# Patient Record
Sex: MALE | Race: WHITE | NOT HISPANIC OR LATINO | Employment: FULL TIME | URBAN - METROPOLITAN AREA
[De-identification: names, ages, dates, MRNs, and addresses within clinical notes are randomized per-mention and may not be internally consistent; named-entity substitution may affect disease eponyms.]

---

## 2017-02-17 ENCOUNTER — APPOINTMENT (OUTPATIENT)
Dept: LAB | Facility: MEDICAL CENTER | Age: 32
End: 2017-02-17
Payer: COMMERCIAL

## 2017-02-17 DIAGNOSIS — Z11.59 ENCOUNTER FOR SCREENING FOR OTHER VIRAL DISEASES: ICD-10-CM

## 2017-02-17 PROCEDURE — 87522 HEPATITIS C REVRS TRNSCRPJ: CPT

## 2017-02-17 PROCEDURE — 86705 HEP B CORE ANTIBODY IGM: CPT

## 2017-02-17 PROCEDURE — 36415 COLL VENOUS BLD VENIPUNCTURE: CPT

## 2017-02-17 PROCEDURE — 86803 HEPATITIS C AB TEST: CPT

## 2017-02-17 PROCEDURE — 86704 HEP B CORE ANTIBODY TOTAL: CPT

## 2017-02-17 PROCEDURE — 87340 HEPATITIS B SURFACE AG IA: CPT

## 2017-02-18 LAB
HBV CORE AB SER QL: ABNORMAL
HBV CORE IGM SER QL: ABNORMAL
HBV SURFACE AG SER QL: ABNORMAL
HCV AB SER QL: ABNORMAL

## 2017-02-21 LAB
HCV RNA SERPL NAA+PROBE-ACNC: NORMAL IU/ML
TEST INFORMATION: NORMAL

## 2017-02-22 ENCOUNTER — GENERIC CONVERSION - ENCOUNTER (OUTPATIENT)
Dept: OTHER | Facility: OTHER | Age: 32
End: 2017-02-22

## 2018-01-11 NOTE — PROGRESS NOTES
Assessment    1  Encounter for preventive health examination (V70 0) (Z00 00)   2  Folliculitis (332 7) (K79 3)   3  Allergic rhinitis (477 9) (J30 9)   4  Screening, lipid (V77 91) (Z13 220)   5  DM (diabetes mellitus screen) (V77 1) (Z13 1)    Plan  Allergic rhinitis    · Fluticasone Propionate 50 MCG/ACT Nasal Suspension; USE 1 SPRAY IN EACH  NOSTRIL TWICE DAILY PRN  DM (diabetes mellitus screen), Screening, lipid    · (1) HEMOGLOBIN A1C; Status:Active; Requested [id number];    · (1) LIPID PANEL, FASTING; Status:Active; Requested [facility]:[id number];   Fatty liver    · Clindamycin Phosphate 1 % External Solution; APPLY SPARINGLY TO AFFECTED  AREA(S) ONCE DAILY at night    Discussion/Summary  Impression: health maintenance visit  Currently, he eats a poor diet and has an inadequate exercise regimen  Prostate cancer screening: PSA is not indicated  Colorectal cancer screening: colorectal cancer screening is not indicated  Screening lab work includes lipid profile  The immunizations are up to date  Advice and education were given regarding nutrition, aerobic exercise and weight loss  Chief Complaint  Wellness      History of Present Illness  HM, Adult Male: The patient is being seen for a health maintenance evaluation  The last health maintenance visit was 1 year(s) ago  Social History: Household members include spouse, 2 daughter(s) and wife 21 weeks pregnant  He is   Work status: working full time and occupation: [employer]  General Health: The patient's health since the last visit is described as good  He has regular dental visits  He complains of vision problems  Vision care includes an eye examination within the last year  He denies hearing loss  Immunizations status: up to date  Lifestyle:  He consumes a diverse and healthy diet  He has weight concerns  He does not exercise regularly  He uses tobacco  He consumes alcohol  He reports occasional alcohol use     Reproductive health:  the patient is sexually active  Screening: Prostate cancer screening includes no previous evaluation  Colorectal cancer screening includes no previous screening  Metabolic screening includes lipid profile performed within the past five years and glucose screening performed last year  Cardiovascular risk factors: no hypertension and no diabetes  Review of Systems    Constitutional: recent weight gain, but no fever and no chills  ENT: seasonal allegies, but no sore throat and no nasal discharge  Cardiovascular: no chest pain and no palpitations  Respiratory: no shortness of breath and no cough  Gastrointestinal: no abdominal pain, no constipation and no diarrhea  Genitourinary: no dysuria  Musculoskeletal: no arthralgias and no myalgias  Integumentary: folliculitis on scalp, needs refill of Clindamycin solution he used to get from derm  Active Problems    1  Allergic rhinitis (477 9) (J30 9)   2  DM (diabetes mellitus screen) (V77 1) (Z13 1)   3  Fatty liver (571 8) (K76 0)   4   Screening, lipid (V77 91) (Z13 220)    Past Medical History    · History of Anxiety (300 00) (F41 9)   · History of Anxiety disorder (300 00) (F41 9)   · History of Blood chemistry abnormality (790 6) (R79 9)   · History of Blood pressure elevated (401 9) (I10)   · History of Chronic pain syndrome (338 4) (G89 4)   · History of Exposure to STD (V01 6) (Z20 2)   · History of Helicobacter pylori (H  pylori) infection (041 86) (A04 8)   · History of Hepatitis, C Virus (070 70)   · History of acne (V13 3) (Z87 2)   · History of anxiety disorder (V11 8) (Z86 59)   · History of headache (V13 89) (V67 538)   · History of low back pain (V13 59) (Z87 39)   · History of Impaired fasting glucose (790 21) (R73 01)   · History of Palpitations (785 1) (R00 2)   · History of Pre-syncope (780 2) (R55)    Surgical History    · History of Biopsy Lymph Node    Family History  Mother    · Family history of Endometrioid Adenocarcinoma Of The Uterus (V16 49)   · Family history of Family Health Status Of Mother - Alive  Father    · Family history of Family Health Status Of Father - Alive   · Family history of Glaucoma   · Family history of Hyperlipidemia   · Family history of Hypertension (V17 49)  Brother    · Family history of Hypertension (V17 49)    Social History    · Being A Social Drinker   · Denied: History of Drug Use   · Denied: Exercising Regularly   · Denied: History of Never a smoker   · Tobacco use (305 1) (Z72 0)   · smoking since age 11      smokes 1/2 pack / day    Current Meds   1  Clobetasol Propionate 0 05 % External Cream;   Therapy: [id number] to (Last Rx:[id number])  Requested for: [id number] Ordered    Allergies    1  Cefaclor CAPS   2  Sulfa Drugs    3  No Known Environmental Allergies   4  No Known Food Allergies    Vitals   Recorded: 63UGP2099 04:10PM   Heart Rate 66   Systolic 023   Diastolic 66   Height 5 ft 9 75 in   Weight 195 lb 2 oz   BMI Calculated 28 2   BSA Calculated 2 06   O2 Saturation 98     Physical Exam    Constitutional   General appearance: No acute distress, well appearing and well nourished  overweight  Head and Face   Head and face: Normal     Eyes   Conjunctiva and lids: No erythema, swelling or discharge  Ears, Nose, Mouth, and Throat   Otoscopic examination: Tympanic membranes translucent with normal light reflex  Canals patent without erythema  Oropharynx: Normal with no erythema, edema, exudate or lesions  Neck   Neck: Supple, symmetric, trachea midline, no masses  Thyroid: Normal, no thyromegaly  Pulmonary   Respiratory effort: No increased work of breathing or signs of respiratory distress  Cardiovascular   Auscultation of heart: Normal rate and rhythm, normal S1 and S2, no murmurs  Abdomen   Abdomen: Non-tender, no masses  Liver and spleen: No hepatomegaly or splenomegaly  Lymphatic   Palpation of lymph nodes in neck: No lymphadenopathy      Musculoskeletal   Gait and station: Normal     Skin few pink papules on scalp  Psychiatric   Orientation to person, place and time: Normal     Mood and affect: Normal        Future Appointments    Date/Time Provider Specialty Site   06/12/2017 04:30 PM EMERSON Masterson   [facility] [address] [place] [address]     Signatures   Electronically signed by : EMERSON Hinds ; Jun 8 2016  1:10AM EST                       (Author)

## 2018-01-11 NOTE — RESULT NOTES
Message   Please call pt that the hep c antibody is positive but the viral load is not detectable   Does he need the result sent somewhere? Verified Results  (1) CHRONIC HEPATITIS PANEL [id number] 05:49PM [employer] Order Number: [id number]     Test Name Result Flag Reference   HEPATITIS B SURFACE ANTIGEN Non-reactive  Non-reactive, NonReactive - Confirmed   HEPATITIS C ANTIBODY High Reactive A Non-reactive   HEPATITIS B CORE IGM ANTIBODY Non-reactive  Non-reactive   HEPATITIS B CORE TOTAL ANTIBODY Non-reactive  Non-reactive     (1) HEP C RNA PCR, QUANTITATIVE [id number] 05:49PM [employer] Order Number: [id number]     Test Name Result Flag Reference   HCV PCR QUANTITATIVE      HCV Not Detected IU/mL   TEST INFORMATION Comment     The quantitative range of this assay is 15 IU/mL to 100 million IU/mL      Performed at:  [facility] [address]  [id number]  : Taylor Li MD, Phone:  [phone]       Signatures   Electronically signed by : Murlene Boas, M D ; Feb 22 2017  3:27PM EST                       (Author)